# Patient Record
Sex: FEMALE | Race: BLACK OR AFRICAN AMERICAN | NOT HISPANIC OR LATINO | Employment: FULL TIME | URBAN - METROPOLITAN AREA
[De-identification: names, ages, dates, MRNs, and addresses within clinical notes are randomized per-mention and may not be internally consistent; named-entity substitution may affect disease eponyms.]

---

## 2017-01-09 RX ORDER — INSULIN HUMAN 500 [IU]/ML
INJECTION, SOLUTION SUBCUTANEOUS
Qty: 20 ML | Refills: 2 | Status: SHIPPED | OUTPATIENT
Start: 2017-01-09

## 2017-03-21 ENCOUNTER — TELEPHONE (OUTPATIENT)
Dept: DIABETES | Facility: CLINIC | Age: 37
End: 2017-03-21

## 2017-03-21 NOTE — TELEPHONE ENCOUNTER
----- Message from Farhana Murillo sent at 3/20/2017 11:33 AM CDT -----  [facility], #[phone] ext [phone], needs Rx for testing supplies faxed to [phone]./ States the pt is running out of supplies.

## 2017-09-15 ENCOUNTER — OFFICE VISIT (OUTPATIENT)
Dept: URGENT CARE | Facility: CLINIC | Age: 37
End: 2017-09-15
Payer: COMMERCIAL

## 2017-09-15 VITALS
SYSTOLIC BLOOD PRESSURE: 132 MMHG | TEMPERATURE: 98 F | BODY MASS INDEX: 45.99 KG/M2 | OXYGEN SATURATION: 97 % | WEIGHT: 293 LBS | HEIGHT: 67 IN | DIASTOLIC BLOOD PRESSURE: 80 MMHG | HEART RATE: 101 BPM

## 2017-09-15 DIAGNOSIS — L03.116 CELLULITIS OF LEFT FOOT: Primary | ICD-10-CM

## 2017-09-15 DIAGNOSIS — B35.3 TINEA PEDIS, LEFT: ICD-10-CM

## 2017-09-15 PROCEDURE — 99213 OFFICE O/P EST LOW 20 MIN: CPT | Mod: S$GLB,,, | Performed by: NURSE PRACTITIONER

## 2017-09-15 PROCEDURE — 99999 PR PBB SHADOW E&M-EST. PATIENT-LVL IV: CPT | Mod: PBBFAC,,, | Performed by: NURSE PRACTITIONER

## 2017-09-15 PROCEDURE — 3075F SYST BP GE 130 - 139MM HG: CPT | Mod: S$GLB,,, | Performed by: NURSE PRACTITIONER

## 2017-09-15 PROCEDURE — 3079F DIAST BP 80-89 MM HG: CPT | Mod: S$GLB,,, | Performed by: NURSE PRACTITIONER

## 2017-09-15 PROCEDURE — 3008F BODY MASS INDEX DOCD: CPT | Mod: S$GLB,,, | Performed by: NURSE PRACTITIONER

## 2017-09-15 RX ORDER — CLOTRIMAZOLE 1 %
CREAM (GRAM) TOPICAL 2 TIMES DAILY
Qty: 45 G | Refills: 1 | Status: SHIPPED | OUTPATIENT
Start: 2017-09-15

## 2017-09-15 RX ORDER — SULFAMETHOXAZOLE AND TRIMETHOPRIM 800; 160 MG/1; MG/1
1 TABLET ORAL 2 TIMES DAILY
Qty: 20 TABLET | Refills: 0 | Status: SHIPPED | OUTPATIENT
Start: 2017-09-15 | End: 2017-09-25

## 2017-09-16 NOTE — PATIENT INSTRUCTIONS
Follow up with PCP in about 10-14 days, sooner for any new or worsening symptoms.  To ER for fever that doesn't improve with Tylenol or Ibuprofen, worsening pain/swelling/redness despite antibiotics, or any sudden onset of numbness, tingling, or weakness of the foot.    Discharge Instructions for Cellulitis  You have been diagnosed with cellulitis. This is an infection in the deepest layer of the skin. In some cases, the infection also affects the muscle. Cellulitis is caused by bacteria. The bacteria can enter the body through broken skin. This can happen with a cut, scratch, animal bite, or an insect bite that has been scratched. You may have been treated in the hospital with antibiotics and fluids. You will likely be given a prescription for antibiotics to take at home. This sheet will help you take care of yourself at home.  Home care  When you are home:  · Take the prescribed antibiotic medicine you are given as directed until it is gone. Take it even if you feel better. It treats the infection and stops it from returning. Not taking all the medicine can make future infections hard to treat.  · Keep the infected area clean.  · When possible, raise the infected area above the level of your heart. This helps keep swelling down.  · Talk with your healthcare provider if you are in pain. Ask what kind of over-the-counter medicine you can take for pain.  · Apply clean bandages as advised.  · Take your temperature once a day for a week.  · Wash your hands often to prevent spreading the infection.  In the future, wash your hands before and after you touch cuts, scratches, or bandages. This will help prevent infection.   When to call your healthcare provider  Call your healthcare provider immediately if you have any of the following:  · Difficulty or pain when moving the joints above or below the infected area  · Discharge or pus draining from the area  · Fever of 100.4°F (38°C) or higher, or as directed by your  healthcare provider  · Pain that gets worse in or around the infected   · Redness that gets worse in or around the infected area, particularly if the area of redness expands to a wider area  · Shaking chills  · Swelling of the infected area  · Vomiting   Date Last Reviewed: 8/1/2016 © 2000-2017 [employer]. [address] [place] [address]. All rights reserved. This information is not intended as a substitute for professional medical care. Always follow your healthcare professional's instructions.        Fungal Skin Infection (Tinea)  A fungal infection occurs when too much fungus grows on or in the body. Fungus normally lives on the skin in small amounts and does not cause harm. But when too much grows on the skin, it causes an infection. This is also known as tinea. Fungal skin infections are common and not usually serious.  The infection often starts as a small red area the size of a pea. The skin may turn dry and flaky. The area may itch. As the fungus grows, it spreads out in a red [place]. Because of how it looks, fungal skin infection is often called ringworm, but it is not caused by a worm. Fungal skin infections can occur on many parts of the body. They can grow on the head, chest, arms, or legs. They can occur on the buttocks. On the feet, fungal infection is known as athletes foot. It causes itchy, sometimes painful sores between the toes and the bottom or sides of the feet. In the groin, the rash is called jock itch.  People with weak immune systems can get a fungal infection more easily. This includes people with diabetes or HIV, or who are being treated for cancer. In these cases, the fungal infection can spread and cause severe illness. Fungal infections are also more common in people who are overweight.  In most cases, treatment is done with antifungal cream or ointment. If the infection is on your scalp, you may take oral medicine. In some cases, a tiny piece of the skin  (biopsy) may be taken. This is so it can be tested in a lab.  Common fungal infections are treated with creams on the skin or oral medicine.  Home care  Follow all instructions when using antifungal cream or ointment on your skin. Your healthcare provider may advise using cornstarch powder to keep your skin dry or petroleum jelly to provide a barrier.  General care:  · If you were prescribed an oral medicine, read the patient information. Talk with your healthcare provider about the risks and side effects.  · Let your skin dry completely after bathing. Carefully dry your feet and between your toes.  · Dress in loose cotton clothing.  · Dont scratch the affected area. This can delay healing and may spread the infection. It can also cause a bacterial infection.  · Keep your skin clean, but dont wash the skin too much. This can irritate your skin.  · Keep in mind that it may take a week before the fungus starts to go away. It can take 2 to 4 weeks to fully clear. To prevent it from coming back, use the medicine until the rash is all gone.  Follow-up care  Follow up with your healthcare provider if the rash does not get better after 10 days of treatment. Also follow up if the rash spreads to other parts of your body.  When to seek medical advice  Call your healthcare provider right away if any of these occur:  · Fever of 100.4°F (38°C) or higher  · Redness or swelling that gets worse  · Pain that gets worse  · Foul-smelling fluid leaking from the skin  Date Last Reviewed: 11/1/2016  © 7444-2813 [employer]. [address] [place] [address], [place] [address]. All rights reserved. This information is not intended as a substitute for professional medical care. Always follow your healthcare professional's instructions.

## 2017-09-16 NOTE — PROGRESS NOTES
"Subjective:      Patient ID: Adela Barger is a 37 y.o. female.    Chief Complaint: Foot Swelling ("left foot swelling x's 2 days")    Ms. Barger presents to Urgent Care today with complaints of left foot pain and swelling x 2-3 days. She denies any trauma or activity outside of her usual routine. She has some tenderness to the lateral part of her foot, but states that she doesn't feel like she is unable to walk or move the foot and ankle normally. Mild redness noted. She does admit to having blood glucose readings that are much more elevated than her usual baseline (up to 300). She denies fever but has been feeling generally bad and fatigued. No calf pain. No long periods of immobility, recent hospitalizations, HRT, or personal/family history of VTE. No SOB or CP.       Review of Systems   Constitutional: Positive for fatigue. Negative for fever.   HENT: Negative.    Respiratory: Negative.    Cardiovascular: Negative.    Gastrointestinal: Negative.    Musculoskeletal: Positive for joint swelling (left foot). Negative for back pain and gait problem.   Skin: Negative.    Neurological: Negative.    Hematological: Negative.        Objective:   /80 (BP Location: Right arm, Patient Position: Sitting, BP Method: Large (Manual))   Pulse 101   Temp 97.8 °F (36.6 °C) (Tympanic)   Ht 5' 7" (1.702 m)   Wt (!) 173.8 kg (383 lb 2.6 oz)   SpO2 97%   BMI 60.01 kg/m²   Physical Exam   Constitutional: She is oriented to person, place, and time. She appears well-developed and well-nourished. No distress.   Neck: Normal range of motion. Neck supple.   Cardiovascular: Normal rate.    Pulses:       Dorsalis pedis pulses are 2+ on the right side, and 2+ on the left side.   Pulmonary/Chest: Effort normal. No respiratory distress.   Musculoskeletal:        Left foot: There is tenderness and swelling. There is normal range of motion, no bony tenderness, normal capillary refill, no crepitus, no deformity and no laceration. "   Foot is mildly tender to palpation on lateral aspect. Normal ankle exam. Normal distal sensation and cap refill. No pre-tibial pitting edema. No calf tenderness.    Feet:   Right Foot:   Skin Integrity: Negative for erythema, warmth or dry skin.   Left Foot:   Skin Integrity: Positive for erythema, warmth and dry skin (dry scaly skin to ball of foot and between toes). Negative for ulcer or blister.   Neurological: She is alert and oriented to person, place, and time.   Skin: Skin is warm and dry. She is not diaphoretic.   Nursing note and vitals reviewed.    Assessment:      1. Cellulitis of left foot    2. Tinea pedis, left       Plan:   Cellulitis of left foot  -     sulfamethoxazole-trimethoprim 800-160mg (BACTRIM DS) 800-160 mg Tab; Take 1 tablet by mouth 2 (two) times daily.  Dispense: 20 tablet; Refill: 0    Tinea pedis, left  -     clotrimazole (LOTRIMIN) 1 % cream; Apply topically 2 (two) times daily.  Dispense: 45 g; Refill: 1    Instructions, follow up, and supportive care as per AVS.  Follow up with PCP in 10-14 days, sooner if not improving or for any new or worsening symptoms.  Red flag symptoms that warrant immediate emergency department evaluation as per AVS.

## 2017-09-21 ENCOUNTER — OFFICE VISIT (OUTPATIENT)
Dept: URGENT CARE | Facility: CLINIC | Age: 37
End: 2017-09-21
Payer: COMMERCIAL

## 2017-09-21 ENCOUNTER — HOSPITAL ENCOUNTER (OUTPATIENT)
Dept: RADIOLOGY | Facility: HOSPITAL | Age: 37
Discharge: HOME | End: 2017-09-21
Attending: NURSE PRACTITIONER
Payer: COMMERCIAL

## 2017-09-21 VITALS
DIASTOLIC BLOOD PRESSURE: 88 MMHG | TEMPERATURE: 98 F | HEIGHT: 67 IN | BODY MASS INDEX: 45.99 KG/M2 | HEART RATE: 105 BPM | SYSTOLIC BLOOD PRESSURE: 152 MMHG | WEIGHT: 293 LBS | OXYGEN SATURATION: 98 %

## 2017-09-21 DIAGNOSIS — M25.475 SWELLING OF FOOT JOINT, LEFT: Primary | ICD-10-CM

## 2017-09-21 DIAGNOSIS — M25.475 SWELLING OF FOOT JOINT, LEFT: ICD-10-CM

## 2017-09-21 PROCEDURE — 3079F DIAST BP 80-89 MM HG: CPT | Mod: S$GLB,,, | Performed by: NURSE PRACTITIONER

## 2017-09-21 PROCEDURE — 3008F BODY MASS INDEX DOCD: CPT | Mod: S$GLB,,, | Performed by: NURSE PRACTITIONER

## 2017-09-21 PROCEDURE — 3077F SYST BP >= 140 MM HG: CPT | Mod: S$GLB,,, | Performed by: NURSE PRACTITIONER

## 2017-09-21 PROCEDURE — 99999 PR PBB SHADOW E&M-EST. PATIENT-LVL V: CPT | Mod: PBBFAC,,, | Performed by: NURSE PRACTITIONER

## 2017-09-21 PROCEDURE — 73630 X-RAY EXAM OF FOOT: CPT | Mod: 26,LT,, | Performed by: RADIOLOGY

## 2017-09-21 PROCEDURE — 73630 X-RAY EXAM OF FOOT: CPT | Mod: TC,PO,LT

## 2017-09-21 PROCEDURE — 99213 OFFICE O/P EST LOW 20 MIN: CPT | Mod: S$GLB,,, | Performed by: NURSE PRACTITIONER

## 2017-09-21 RX ORDER — BUPROPION HYDROCHLORIDE 300 MG/1
TABLET ORAL
COMMUNITY
Start: 2017-07-14

## 2017-09-21 NOTE — PROGRESS NOTES
"Subjective:       Patient ID: Adela Barger is a 37 y.o. female.    Chief Complaint: Foot Swelling    HPI  Adela presents with complaints of left foot pain. This is not a new problem. One week ago she was seen in urgent care and treated with bactrim for cellulitis. She has been taking the antibiotic as prescribed but the foot has continued to swell and is painful. She states the pain has actually intensified. The pain occurs with weight bearing and is not so bad when at rest. The pain is located at the lateral aspect of the foot near the base of the fifth metatarsal. She denies trauma.   BP (!) 152/88 (BP Location: Right arm, Patient Position: Sitting, BP Method: Medium (Manual))   Pulse 105   Temp 98.2 °F (36.8 °C) (Tympanic)   Ht 5' 7" (1.702 m)   Wt (!) 173.6 kg (382 lb 11.5 oz)   SpO2 98%   BMI 59.94 kg/m²     Review of Systems   Constitutional: Negative for chills and fever.   HENT: Negative for congestion.    Respiratory: Negative for cough and chest tightness.    Cardiovascular: Negative for palpitations.   Gastrointestinal: Negative for diarrhea, nausea and vomiting.   Genitourinary: Negative for difficulty urinating.   Musculoskeletal: Positive for arthralgias and joint swelling.   Skin: Positive for color change.   Neurological: Negative for headaches.   Psychiatric/Behavioral: Negative for behavioral problems and confusion.       Objective:      Physical Exam   Constitutional: She is oriented to person, place, and time. She appears well-developed and well-nourished. No distress.   HENT:   Head: Normocephalic and atraumatic.   Eyes: Conjunctivae and EOM are normal. Pupils are equal, round, and reactive to light.   Neck: Normal range of motion.   Cardiovascular: Normal rate, regular rhythm and intact distal pulses.    No murmur heard.  Pulmonary/Chest: Breath sounds normal. No respiratory distress. She has no wheezes.   Abdominal: Soft. Bowel sounds are normal. There is no tenderness. "   Musculoskeletal: Normal range of motion. She exhibits no edema or deformity.        Left foot: There is tenderness (Laterally and at the base of the 4/5 th metatarsals,) and swelling. There is normal range of motion and normal capillary refill.        Feet:    Lymphadenopathy:     She has no cervical adenopathy.   Neurological: She is alert and oriented to person, place, and time.   Skin: Skin is warm and dry. No rash noted. She is not diaphoretic.   Psychiatric: She has a normal mood and affect. Her behavior is normal.   Vitals reviewed.      Assessment:       1. Swelling of foot joint, left        Plan:       Adela was seen today for foot swelling.    Diagnoses and all orders for this visit:    Swelling of foot joint, left  -     Cancel: X-Ray Foot 2 View Left; Future  -     CBC auto differential; Future  -     Uric acid; Future  -     Ambulatory referral to Podiatry    Will schedule labs and follow up with podiatry tomorrow. I am not sure this is cellulitis.   The pt has been informed that her BP is elevated in clinic today. . I recommend that the pt call her PCP  this week to arrange f/u for further evaluation. She has taken her BP medication today.

## 2017-09-22 ENCOUNTER — LAB VISIT (OUTPATIENT)
Dept: LAB | Facility: HOSPITAL | Age: 37
End: 2017-09-22
Attending: NURSE PRACTITIONER
Payer: COMMERCIAL

## 2017-09-22 ENCOUNTER — OFFICE VISIT (OUTPATIENT)
Dept: PODIATRY | Facility: CLINIC | Age: 37
End: 2017-09-22
Payer: COMMERCIAL

## 2017-09-22 VITALS
SYSTOLIC BLOOD PRESSURE: 132 MMHG | HEART RATE: 86 BPM | DIASTOLIC BLOOD PRESSURE: 100 MMHG | WEIGHT: 293 LBS | BODY MASS INDEX: 59.63 KG/M2

## 2017-09-22 DIAGNOSIS — R60.0 EDEMA OF EXTREMITIES: ICD-10-CM

## 2017-09-22 DIAGNOSIS — M65.9 TENOSYNOVITIS OF FOOT: Primary | ICD-10-CM

## 2017-09-22 DIAGNOSIS — E11.9 TYPE 2 DIABETES MELLITUS WITHOUT COMPLICATION, UNSPECIFIED LONG TERM INSULIN USE STATUS: ICD-10-CM

## 2017-09-22 DIAGNOSIS — E66.01 MORBID OBESITY WITH BMI OF 50.0-59.9, ADULT: ICD-10-CM

## 2017-09-22 DIAGNOSIS — M25.475 SWELLING OF FOOT JOINT, LEFT: ICD-10-CM

## 2017-09-22 LAB
BASOPHILS # BLD AUTO: 0.02 K/UL
BASOPHILS NFR BLD: 0.3 %
DIFFERENTIAL METHOD: NORMAL
EOSINOPHIL # BLD AUTO: 0.1 K/UL
EOSINOPHIL NFR BLD: 1.9 %
ERYTHROCYTE [DISTWIDTH] IN BLOOD BY AUTOMATED COUNT: 13.1 %
HCT VFR BLD AUTO: 42.4 %
HGB BLD-MCNC: 14.3 G/DL
LYMPHOCYTES # BLD AUTO: 2.1 K/UL
LYMPHOCYTES NFR BLD: 31.1 %
MCH RBC QN AUTO: 30.1 PG
MCHC RBC AUTO-ENTMCNC: 33.7 G/DL
MCV RBC AUTO: 89 FL
MONOCYTES # BLD AUTO: 0.5 K/UL
MONOCYTES NFR BLD: 6.6 %
NEUTROPHILS # BLD AUTO: 4.1 K/UL
NEUTROPHILS NFR BLD: 60.1 %
PLATELET # BLD AUTO: 268 K/UL
PMV BLD AUTO: 9.3 FL
RBC # BLD AUTO: 4.75 M/UL
URATE SERPL-MCNC: 4.6 MG/DL
WBC # BLD AUTO: 6.81 K/UL

## 2017-09-22 PROCEDURE — 99203 OFFICE O/P NEW LOW 30 MIN: CPT | Mod: 25,S$GLB,, | Performed by: PODIATRIST

## 2017-09-22 PROCEDURE — 85025 COMPLETE CBC W/AUTO DIFF WBC: CPT | Mod: PO

## 2017-09-22 PROCEDURE — 84550 ASSAY OF BLOOD/URIC ACID: CPT | Mod: PO

## 2017-09-22 PROCEDURE — 3008F BODY MASS INDEX DOCD: CPT | Mod: S$GLB,,, | Performed by: PODIATRIST

## 2017-09-22 PROCEDURE — 99999 PR PBB SHADOW E&M-EST. PATIENT-LVL III: CPT | Mod: PBBFAC,,, | Performed by: PODIATRIST

## 2017-09-22 PROCEDURE — 3080F DIAST BP >= 90 MM HG: CPT | Mod: S$GLB,,, | Performed by: PODIATRIST

## 2017-09-22 PROCEDURE — 36415 COLL VENOUS BLD VENIPUNCTURE: CPT | Mod: PO

## 2017-09-22 PROCEDURE — 3075F SYST BP GE 130 - 139MM HG: CPT | Mod: S$GLB,,, | Performed by: PODIATRIST

## 2017-09-22 RX ORDER — LOSARTAN POTASSIUM AND HYDROCHLOROTHIAZIDE 25; 100 MG/1; MG/1
1 TABLET ORAL
COMMUNITY
Start: 2017-07-20 | End: 2018-07-20

## 2017-09-22 RX ORDER — BLOOD-GLUCOSE TRANSMITTER
EACH MISCELLANEOUS
COMMUNITY
Start: 2017-09-07

## 2017-09-22 RX ORDER — LOSARTAN POTASSIUM AND HYDROCHLOROTHIAZIDE 25; 100 MG/1; MG/1
TABLET ORAL
COMMUNITY
Start: 2017-07-20 | End: 2017-09-22 | Stop reason: SDUPTHER

## 2017-09-22 RX ORDER — BLOOD-GLUCOSE SENSOR
EACH MISCELLANEOUS
COMMUNITY
Start: 2017-09-07

## 2017-09-22 NOTE — LETTER
September 25, 2017      Deena Amos, [facility]  [address] [facility] [place] [address]           [facility]  [address] [facility]  [place] [address]  Phone: [phone]          Patient: Adela Barger   MR Number: [id number]   YOB: 1980   Date of Visit: 9/22/2017       Dear Deena Amos:    Thank you for referring Adela Barger to me for evaluation. Attached you will find relevant portions of my assessment and plan of care.    If you have questions, please do not hesitate to call me. I look forward to following Adela Barger along with you.    Sincerely,        Enclosure  CC:  No Recipients    If you would like to receive this communication electronically, please contact [email or web address] or [phone] to request more information on [email or web address] Link access.    For providers and/or their staff who would like to refer a patient to [facility], please contact us through our one-stop-shop provider referral line, Luiza Mejia, at [phone].    If you feel you have received this communication in error or would no longer like to receive these types of communications, please e-mail [email or web address]

## 2017-09-27 NOTE — PROGRESS NOTES
Office Visit 9/22/2017  Last encounter in this department: Visit date not found    Subjective:      Patient ID: Adela Barger is a 37 y.o. female.    Chief Complaint: Consult (pcp Dr. Estrada, 6/2017)    Adela Barger is a 37 y.o. year-old female presenting to podiatry clinic with complaint of swelling and pain left foot. The patient describes the pain as a tingling aching that is as bad as a  7/10 pain over the past 1 weeks. Onset of the pain was sudden.     The patient notices the pain to be worse with pressure, increased ambulation, prolonged standing and some shoe gear.    The patient has tried wrapping and antibiotics prescribed by urgent care yesterday. She is also here to review the labs and x-rays taken at that appointment. The patient does not recall any injuries or inciting events leading to this problem.        Review of Systems   Constitution: Negative for chills and fever.   Cardiovascular: Negative for chest pain.   Respiratory: Negative for shortness of breath.    Gastrointestinal: Negative for nausea and vomiting.           Objective:      Physical Exam   Constitutional: She is oriented to person, place, and time. She appears well-developed and well-nourished. No distress.   Cardiovascular:   Pulses:       Dorsalis pedis pulses are 2+ on the right side, and 2+ on the left side.        Posterior tibial pulses are 2+ on the right side, and 2+ on the left side.   Capillary fill time 3 seconds to digits bilateral feet.   Musculoskeletal:   Lower extremities:    Deformities: None    Normal arch height and rectus feet bilaterally.     5/5 muscle strength and tone in all four quadrants bilaterally.     Pain and guarded range of motion in all four quadrants WNL left.   Pain-free range of motion in all four quadrants WNL right.    Pain on palpation: diffuse left foot.   Neurological: She is alert and oriented to person, place, and time. She displays no Babinski's sign on the right side. She displays no  Babinski's sign on the left side.   Plantar protective sensation by touch via 5.07 [place] monofilament present bilaterally.   Skin:   Lower extremities:    Normal turgor, texture, temperature bilaterally. Digital hair present bilaterally. Warm equally bilaterally.    Left foot edema.    No varicosities, pigmentary changes.    No wounds, drainage, malodor, erythema, interdigital maceration were noted.  No skin lesions.    Nails are clear bilaterally.   Psychiatric: She has a normal mood and affect.   Nursing note and vitals reviewed.      Lab Results   Component Value Date    URICACID 4.6 09/22/2017     Lab Results   Component Value Date    WBC 6.81 09/22/2017    HGB 14.3 09/22/2017    HCT 42.4 09/22/2017    MCV 89 09/22/2017     09/22/2017           X-rays: Comparison: none    Findings: There is nonspecific soft tissue swelling along the dorsum of the forefoot.  No acute fractures or dislocations visualized.  No definite radiographic evidence of osteomyelitis at this time. Joint spaces are well preserved.  No erosive osseous changes demonstrated.    Assessment:       Encounter Diagnoses   Name Primary?    Tenosynovitis of foot Yes    Type 2 diabetes mellitus without complication, unspecified long term insulin use status     Morbid obesity with BMI of 50.0-59.9, adult     Edema of extremities          Plan:       Adela was seen today for consult.    Diagnoses and all orders for this visit:    Tenosynovitis of foot    Type 2 diabetes mellitus without complication, unspecified long term insulin use status    Morbid obesity with BMI of 50.0-59.9, adult    Edema of extremities      I counseled the patient on her conditions, their implications and medical management.    Labs and x-rays were reviewed and found to be within normal limits.  We discussed this potential differentials including infection, gout, other inflammatory arthritides, and Charcot foot.  At his time lab results and x-rays are  negative so we will proceed conservatively monitoring the foot.  She was placed in a triple wrap compressive dressing on the left foot with the Cam Walker boot.  She may transition out of the boot as symptoms subside.  If symptoms worsen I discussed with her the potential for debilitating joint destruction from any of the above differentials and she is to return to use of the Cam Walker boot with minimal weightbearing and return to clinic immediately.    Basic diabetic foot care education and management was reviewed with the patient according to changes exhibited based on today's exam.  Discussed plan for weight loss, diet, exercise and nutrition.

## 2017-09-27 NOTE — PATIENT INSTRUCTIONS
Elevate feet when resting.    Wear stockings at all times when awake.     Avoid prolonged periods of standing.     Remain in boot at all times until directed otherwise.

## 2017-10-03 ENCOUNTER — TELEPHONE (OUTPATIENT)
Dept: PODIATRY | Facility: CLINIC | Age: 37
End: 2017-10-03

## 2017-10-03 NOTE — TELEPHONE ENCOUNTER
----- Message from Keyana Torres sent at 10/3/2017  2:14 PM CDT -----  Contact: self  Pt has an appt scheduled for 10/06/17 at 3:00p.m.  Pt wants to know if there is a later appt available that day. Per pt she is also available today if there is an appt opening.    Pt can be reached at [phone].    Thank you

## 2017-10-03 NOTE — TELEPHONE ENCOUNTER
Returned call to pt who called to request f/u appointment after 4pm on Friday with Dr. Nataliya DPM. Pt educated that Dr. An does not have any available appointments that day and offered option to reschedule to another day with another podiatrist. Pt offered and accepted appt on 10/9/17 at 2:40p the [facility] with Dr. Keenan DPM. Appt date, time and location confirmed and call ended pleasantly.

## 2018-02-11 ENCOUNTER — OFFICE VISIT (OUTPATIENT)
Dept: URGENT CARE | Facility: CLINIC | Age: 38
End: 2018-02-11
Payer: COMMERCIAL

## 2018-02-11 VITALS
WEIGHT: 293 LBS | HEART RATE: 86 BPM | BODY MASS INDEX: 44.41 KG/M2 | OXYGEN SATURATION: 98 % | TEMPERATURE: 98 F | HEIGHT: 68 IN | DIASTOLIC BLOOD PRESSURE: 90 MMHG | SYSTOLIC BLOOD PRESSURE: 170 MMHG

## 2018-02-11 DIAGNOSIS — R11.2 NON-INTRACTABLE VOMITING WITH NAUSEA, UNSPECIFIED VOMITING TYPE: Primary | ICD-10-CM

## 2018-02-11 PROCEDURE — 3008F BODY MASS INDEX DOCD: CPT | Mod: S$GLB,,, | Performed by: NURSE PRACTITIONER

## 2018-02-11 PROCEDURE — 99999 PR PBB SHADOW E&M-EST. PATIENT-LVL V: CPT | Mod: PBBFAC,,, | Performed by: NURSE PRACTITIONER

## 2018-02-11 PROCEDURE — S0119 ONDANSETRON 4 MG: HCPCS | Mod: S$GLB,,, | Performed by: FAMILY MEDICINE

## 2018-02-11 PROCEDURE — 99214 OFFICE O/P EST MOD 30 MIN: CPT | Mod: S$GLB,,, | Performed by: NURSE PRACTITIONER

## 2018-02-11 RX ORDER — ONDANSETRON 4 MG/1
8 TABLET, ORALLY DISINTEGRATING ORAL
Status: COMPLETED | OUTPATIENT
Start: 2018-02-11 | End: 2018-02-11

## 2018-02-11 RX ORDER — ONDANSETRON 4 MG/1
4 TABLET, ORALLY DISINTEGRATING ORAL EVERY 8 HOURS PRN
Qty: 6 TABLET | Refills: 0 | Status: SHIPPED | OUTPATIENT
Start: 2018-02-11

## 2018-02-11 RX ADMIN — ONDANSETRON 8 MG: 4 TABLET, ORALLY DISINTEGRATING ORAL at 03:02

## 2018-02-11 NOTE — PROGRESS NOTES
"Subjective:       Patient ID: Adela Barger is a 37 y.o. female.    Chief Complaint: Emesis    Patient presents to Urgent Care with nausea, vomiting that started yesterday. She vomited twice today. No abdominal pain, no diarrhea. She has had a hysterectomy. No fever or body aches. She has been drinking gatorade which has her glucose elevated at 208 this am.      Emesis    This is a new problem. The current episode started yesterday. The problem occurs 2 to 4 times per day. The problem has been waxing and waning. The emesis has an appearance of stomach contents. There has been no fever. Associated symptoms include dizziness and headaches. Pertinent negatives include no abdominal pain, arthralgias, chest pain, chills, coughing, diarrhea, fever, myalgias, sweats, URI or weight loss. Risk factors include ill contacts. She has tried increased fluids for the symptoms. The treatment provided mild relief.       BP (!) 170/90 (BP Location: Right arm)   Temp 98.3 °F (36.8 °C)   Ht 5' 8" (1.727 m)   Wt (!) 173.1 kg (381 lb 9.9 oz)   BMI 58.02 kg/m²     Review of Systems   Constitutional: Positive for activity change and appetite change. Negative for chills, diaphoresis, fatigue, fever and weight loss.   HENT: Negative.    Eyes: Negative for visual disturbance.   Respiratory: Negative for cough, shortness of breath and wheezing.    Cardiovascular: Negative for chest pain, palpitations and leg swelling.   Gastrointestinal: Positive for nausea and vomiting. Negative for abdominal distention, abdominal pain, anal bleeding, blood in stool, constipation, diarrhea and rectal pain.   Genitourinary: Negative for decreased urine volume, difficulty urinating, dysuria, frequency, hematuria and urgency.   Musculoskeletal: Negative for arthralgias and myalgias.   Neurological: Positive for dizziness and headaches. Negative for syncope, speech difficulty and light-headedness.   Psychiatric/Behavioral: Negative for agitation, confusion " and hallucinations. The patient is not nervous/anxious.        Objective:      Physical Exam   Constitutional: She is oriented to person, place, and time. She appears well-developed and well-nourished. She is cooperative. No distress.   HENT:   Head: Normocephalic and atraumatic.   Eyes: Conjunctivae are normal. Right eye exhibits no discharge. Left eye exhibits no discharge.   Cardiovascular: Normal rate, regular rhythm and normal heart sounds.    No murmur heard.  Pulmonary/Chest: Effort normal and breath sounds normal. No respiratory distress. She has no wheezes. She has no rales. She exhibits no tenderness.   Abdominal: Soft. Normal appearance and bowel sounds are normal. She exhibits no distension. There is no tenderness. There is no rigidity, no rebound, no guarding, no CVA tenderness, no tenderness at McBurney's point and negative Pulido's sign.   Obese abd    Musculoskeletal: Normal range of motion.   Neurological: She is alert and oriented to person, place, and time.   Skin: Skin is warm and dry. No rash noted. She is not diaphoretic.   Psychiatric: She has a normal mood and affect. Her behavior is normal. Judgment and thought content normal.   Nursing note and vitals reviewed.      Assessment:       1. Non-intractable vomiting with nausea, unspecified vomiting type    2. Uncontrolled type 2 diabetes mellitus without complication, without long-term current use of insulin        Plan:       Adela was seen today for emesis.    Diagnoses and all orders for this visit:    Non-intractable vomiting with nausea, unspecified vomiting type  -     ondansetron (ZOFRAN-ODT) 4 MG TbDL; Take 1 tablet (4 mg total) by mouth every 8 (eight) hours as needed.  -     ondansetron disintegrating tablet 8 mg; Take 2 tablets (8 mg total) by mouth one time.    Uncontrolled type 2 diabetes mellitus without complication, without long-term current use of insulin    zofran prn nausea  Stop powerade and switch to powerade zero  Diet  guidelines given  Push fluids  Rest  If symptoms worsen or fail to improve with treatment, see your Primary Care Provider or go to the nearest Emergency Room.

## 2018-02-11 NOTE — PATIENT INSTRUCTIONS
Diet for Vomiting or Diarrhea (Adult)    Your symptoms may return or get worse after eating certain foods listed below. If this happens, stop eating these foods until your symptoms ease and you feel better.  Once the vomiting stops, follow the steps below.   During the first 12 to 24 hours  During the first 12 to 24 hours, follow this diet:  · Drinks. Plain water, sport drinks like electrolyte solutions, soft drinks without caffeine, mineral water (plain or flavored), clear fruit juices, and decaffeinated tea and coffee.  · Soups. Clear broth.  · Desserts. Plain gelatin, popsicles, and fruit juice bars. As you feel better, you may add 6 to 8 ounces of yogurt per day. If you have diarrhea, don't have foods or drinks that contain sugar, high-fructose corn syrup, or sugar alcohols.  During the next 24 hours  During the next 24 hours you may add the following to the above:  · Hot cereal, plain toast, bread, rolls, and crackers  · Plain noodles, rice, mashed potatoes, and chicken noodle or rice soup  · Unsweetened canned fruit (but not pineapple) and bananas  Don't eat more than 15 grams of fat a day. Do this by staying away from margarine, butter, oils, mayonnaise, sauces, gravies, fried foods, peanut butter, meat, poultry, and fish.  Don't eat much fiber. Stay away from raw or cooked vegetables, fresh fruits (except bananas), and bran cereals.  Limit how much caffeine and chocolate you have. Do not use any spices or seasonings except salt.  During the next 24 hours  Slowly go back to your normal diet, as you feel better and your symptoms ease.  Date Last Reviewed: 8/1/2016  © 0597-2866 [employer]. [address] [place] [address], [place] [address]. All rights reserved. This information is not intended as a substitute for professional medical care. Always follow your healthcare professional's instructions.

## 2018-04-06 RX ORDER — BLOOD-GLUCOSE TRANSMITTER
EACH MISCELLANEOUS
Refills: 3 | OUTPATIENT
Start: 2018-04-06

## 2018-04-06 RX ORDER — BLOOD-GLUCOSE SENSOR
EACH MISCELLANEOUS
Refills: 0 | OUTPATIENT
Start: 2018-04-06

## 2018-05-22 ENCOUNTER — TELEPHONE (OUTPATIENT)
Dept: DIABETES | Facility: CLINIC | Age: 38
End: 2018-05-22

## 2018-05-22 NOTE — TELEPHONE ENCOUNTER
Contacted ALEJANDRO. Informed her patient has not been seen in clinic since 11/2016, and a f/u appt is necessary before pump paperwork will be filled out. Rep voice understanding, and said she will call and update patient.

## 2018-12-20 ENCOUNTER — OFFICE VISIT (OUTPATIENT)
Dept: URGENT CARE | Facility: CLINIC | Age: 38
End: 2018-12-20
Payer: COMMERCIAL

## 2018-12-20 VITALS
HEIGHT: 68 IN | OXYGEN SATURATION: 99 % | SYSTOLIC BLOOD PRESSURE: 132 MMHG | BODY MASS INDEX: 44.41 KG/M2 | WEIGHT: 293 LBS | RESPIRATION RATE: 17 BRPM | TEMPERATURE: 99 F | DIASTOLIC BLOOD PRESSURE: 88 MMHG | HEART RATE: 102 BPM

## 2018-12-20 DIAGNOSIS — J01.10 ACUTE NON-RECURRENT FRONTAL SINUSITIS: Primary | ICD-10-CM

## 2018-12-20 PROCEDURE — 99214 OFFICE O/P EST MOD 30 MIN: CPT | Mod: S$GLB,,, | Performed by: NURSE PRACTITIONER

## 2018-12-20 PROCEDURE — 99999 PR PBB SHADOW E&M-EST. PATIENT-LVL III: CPT | Mod: PBBFAC,,, | Performed by: NURSE PRACTITIONER

## 2018-12-20 RX ORDER — LORATADINE 10 MG/1
10 TABLET ORAL DAILY
Qty: 30 TABLET | Refills: 0 | COMMUNITY
Start: 2018-12-20 | End: 2019-01-19

## 2018-12-20 RX ORDER — FLUTICASONE PROPIONATE 50 MCG
1 SPRAY, SUSPENSION (ML) NASAL DAILY
Qty: 1 BOTTLE | Refills: 0 | Status: SHIPPED | OUTPATIENT
Start: 2018-12-20 | End: 2018-12-30

## 2018-12-20 RX ORDER — AZITHROMYCIN 250 MG/1
TABLET, FILM COATED ORAL
Qty: 6 TABLET | Refills: 0 | Status: SHIPPED | OUTPATIENT
Start: 2018-12-20 | End: 2018-12-25

## 2018-12-21 NOTE — PATIENT INSTRUCTIONS
Sinusitis (Antibiotic Treatment)    The sinuses are air-filled spaces within the bones of the face. They connect to the inside of the nose. Sinusitis is an inflammation of the tissue lining the sinus cavity. Sinus inflammation can occur during a cold. It can also be due to allergies to pollens and other particles in the air. Sinusitis can cause symptoms of sinus congestion and fullness. A sinus infection causes fever, headache and facial pain. There is often green or yellow drainage from the nose or into the back of the throat (post-nasal drip). You have been given antibiotics to treat this condition.  Home care:  · Take the full course of antibiotics as instructed. Do not stop taking them, even if you feel better.  · Drink plenty of water, hot tea, and other liquids. This may help thin mucus. It also may promote sinus drainage.  · Heat may help soothe painful areas of the face. Use a towel soaked in hot water. Or,  the shower and direct the hot spray onto your face. Using a vaporizer along with a menthol rub at night may also help.   · An expectorant containing guaifenesin may help thin the mucus and promote drainage from the sinuses.  · Over-the-counter decongestants may be used unless a similar medicine was prescribed. Nasal sprays work the fastest. Use one that contains phenylephrine or oxymetazoline. First blow the nose gently. Then use the spray. Do not use these medicines more often than directed on the label or symptoms may get worse. You may also use tablets containing pseudoephedrine. Avoid products that combine ingredients, because side effects may be increased. Read labels. You can also ask the pharmacist for help. (NOTE: Persons with high blood pressure should not use decongestants. They can raise blood pressure.)  · Over-the-counter antihistamines may help if allergies contributed to your sinusitis.    · Do not use nasal rinses or irrigation during an acute sinus infection, unless told to by  your health care provider. Rinsing may spread the infection to other sinuses.  · Use acetaminophen or ibuprofen to control pain, unless another pain medicine was prescribed. (If you have chronic liver or kidney disease or ever had a stomach ulcer, talk with your doctor before using these medicines. Aspirin should never be used in anyone under 18 years of age who is ill with a fever. It may cause severe liver damage.)  · Don't smoke. This can worsen symptoms.  Follow-up care  Follow up with your healthcare provider or our staff if you are not improving within the next week.  When to seek medical advice  Call your healthcare provider if any of these occur:  · Facial pain or headache becoming more severe  · Stiff neck  · Unusual drowsiness or confusion  · Swelling of the forehead or eyelids  · Vision problems, including blurred or double vision  · Fever of 100.4ºF (38ºC) or higher, or as directed by your healthcare provider  · Seizure  · Breathing problems  · Symptoms not resolving within 10 days  Date Last Reviewed: 4/13/2015  © 2792-9343 [email or web address] [address] [place] [address], [place] [address]. All rights reserved. This information is not intended as a substitute for professional medical care. Always follow your healthcare professional's instructions.

## 2018-12-21 NOTE — PROGRESS NOTES
Subjective:      Patient ID: Adela Barger is a 38 y.o. female.    Chief Complaint: Nasal Congestion and Cough      Cough   This is a new problem. The current episode started 1 to 4 weeks ago. The problem has been gradually worsening. The problem occurs every few hours. The cough is non-productive. Associated symptoms include headaches, nasal congestion, postnasal drip and rhinorrhea. Pertinent negatives include no chest pain, chills, ear congestion, ear pain, eye redness, fever, heartburn, hemoptysis, myalgias, rash, sore throat, shortness of breath, sweats, weight loss or wheezing. The symptoms are aggravated by lying down. Treatments tried: sudafed, mucinex. The treatment provided no relief. Her past medical history is significant for pneumonia. There is no history of asthma, bronchitis, COPD, emphysema or environmental allergies.     Review of Systems   Constitutional: Negative.  Negative for activity change, appetite change, chills, fatigue, fever, unexpected weight change and weight loss.   HENT: Positive for congestion, postnasal drip, rhinorrhea, sinus pressure and sinus pain. Negative for ear discharge, ear pain, sneezing, sore throat, tinnitus, trouble swallowing and voice change.    Eyes: Positive for discharge (clear watery). Negative for pain, redness and itching.   Respiratory: Positive for cough. Negative for hemoptysis, chest tightness, shortness of breath and wheezing.    Cardiovascular: Negative.  Negative for chest pain and palpitations.   Gastrointestinal: Negative.  Negative for abdominal pain, diarrhea, heartburn, nausea and vomiting.   Endocrine: Negative.  Negative for heat intolerance.   Genitourinary: Negative.  Negative for difficulty urinating and dysuria.   Musculoskeletal: Negative.  Negative for myalgias.   Skin: Negative for rash.   Allergic/Immunologic: Negative for environmental allergies and food allergies.   Neurological: Positive for headaches. Negative for dizziness, weakness  and light-headedness.   Hematological: Negative.  Negative for adenopathy.   Psychiatric/Behavioral: Negative.  Negative for agitation.        Objective:     Vitals:    12/20/18 1821   BP: 132/88   Pulse: 102   Resp: 17   Temp: 98.7 °F (37.1 °C)     Physical Exam   Constitutional: She is oriented to person, place, and time. Vital signs are normal. She appears well-developed and well-nourished. She is cooperative. She appears ill. No distress.   HENT:   Head: Normocephalic.   Right Ear: Hearing, external ear and ear canal normal.   Left Ear: Hearing, external ear and ear canal normal.   Nose: Mucosal edema and rhinorrhea (yellow thick) present. Right sinus exhibits no maxillary sinus tenderness and no frontal sinus tenderness. Left sinus exhibits frontal sinus tenderness. Left sinus exhibits no maxillary sinus tenderness.   Mouth/Throat: Uvula is midline and mucous membranes are normal. No oral lesions. No uvula swelling. Oropharyngeal exudate (white/yellow PND), posterior oropharyngeal edema and posterior oropharyngeal erythema present. No tonsillar abscesses. Tonsils are 2+ on the right. Tonsils are 2+ on the left.   Bilateral TM dull apppearance   Eyes: Conjunctivae, EOM and lids are normal. Pupils are equal, round, and reactive to light. Right eye exhibits discharge (watery). Left eye exhibits discharge (watery).   Neck: Full passive range of motion without pain. Neck supple.   Cardiovascular: Normal rate, regular rhythm, S1 normal, S2 normal and normal heart sounds.   Pulmonary/Chest: Effort normal and breath sounds normal. No accessory muscle usage or stridor. No tachypnea. No respiratory distress. She has no decreased breath sounds. She has no wheezes. She has no rhonchi. She has no rales. She exhibits no tenderness.   Lymphadenopathy:        Head (right side): No tonsillar adenopathy present.        Head (left side): No tonsillar adenopathy present.     She has no cervical adenopathy.   Neurological: She is  alert and oriented to person, place, and time.   Skin: Skin is warm, dry and intact. Capillary refill takes less than 2 seconds. No rash noted.   Nursing note and vitals reviewed.      Assessment:     1. Acute non-recurrent frontal sinusitis        Plan:     Adela was seen today for nasal congestion and cough.    Diagnoses and all orders for this visit:    Acute non-recurrent frontal sinusitis    Other orders  -     azithromycin (Z-SUSAN) 250 MG tablet; Take 2 tablets by mouth on day 1; Take 1 tablet by mouth on days 2-5  -     loratadine (CLARITIN) 10 mg tablet; Take 1 tablet (10 mg total) by mouth once daily.  -     fluticasone (FLONASE) 50 mcg/actuation nasal spray; 1 spray (50 mcg total) by Each Nare route once daily. for 10 days  -     dextromethorphan-guaifenesin (CORICIDIN HBP)  mg Cap; Take 1 capsule by mouth 2 (two) times daily. for 10 days    Sinus/Allergy Symptoms    - Attempt to avoid allergens.  - Use Normal saline nasal spray to wash offending allergens from airways.  - Take antihistamine as prescribed such as Allegra, Zyrtec, Clartin.   - Take Plain Mucinex as directed.   - Drink plenty of fluids.  - Follow up with Primary Care Provider in 1-2 weeks or sooner if needed.              Go to ER immediately if severe allergic response experienced such as difficulty breathing, facial swelling, throat swelling.      NILAY Coles, FNP-C